# Patient Record
Sex: MALE | Race: WHITE | NOT HISPANIC OR LATINO | Employment: OTHER | URBAN - METROPOLITAN AREA
[De-identification: names, ages, dates, MRNs, and addresses within clinical notes are randomized per-mention and may not be internally consistent; named-entity substitution may affect disease eponyms.]

---

## 2017-05-15 ENCOUNTER — INITIAL CONSULT (OUTPATIENT)
Dept: RADIATION ONCOLOGY | Facility: CLINIC | Age: 72
End: 2017-05-15
Payer: MEDICARE

## 2017-05-15 VITALS
RESPIRATION RATE: 18 BRPM | SYSTOLIC BLOOD PRESSURE: 122 MMHG | BODY MASS INDEX: 27.93 KG/M2 | WEIGHT: 173.81 LBS | HEIGHT: 66 IN | TEMPERATURE: 98 F | HEART RATE: 68 BPM | DIASTOLIC BLOOD PRESSURE: 65 MMHG

## 2017-05-15 DIAGNOSIS — C90.00 METASTATIC MULTIPLE MYELOMA TO BONE: ICD-10-CM

## 2017-05-15 PROCEDURE — 99205 OFFICE O/P NEW HI 60 MIN: CPT | Mod: ,,, | Performed by: RADIOLOGY

## 2017-05-15 RX ORDER — SIMVASTATIN 20 MG/1
20 TABLET, FILM COATED ORAL NIGHTLY
COMMUNITY

## 2017-05-15 RX ORDER — POLYETHYLENE GLYCOL 3350 17 G/17G
17 POWDER, FOR SOLUTION ORAL 2 TIMES DAILY
COMMUNITY

## 2017-05-15 NOTE — MR AVS SNAPSHOT
"    [facility]  [address]  [address]  [place] [address]  Phone: [phone]  Fax: [phone]                  Ramo Mirza   5/15/2017 8:00 AM   Initial consult    Description:  Male : 1945   Provider:  [facility], PHYSICIAN SCHED   Department:  [facility]           Reason for Visit     Multiple Myeloma           Diagnoses this Visit        Comments    Metastatic multiple myeloma to bone                To Do List           Future Appointments        Provider Department Dept Phone    2017 1:00 PM [facility] RAD ONC, SIMULATION [facility] [phone]      Goals (5 Years of Data)     None      Follow-Up and Disposition     Return for simulation for tomorrow.    Follow-up and Disposition History           Medications                Verify that the below list of medications is an accurate representation of the medications you are currently taking.  If none reported, the list may be blank. If incorrect, please contact your healthcare provider. Carry this list with you in case of emergency.           Current Medications     polyethylene glycol (MIRALAX) 17 gram PwPk Take 17 g by mouth 2 (two) times daily.    SENNOSIDES (SENOKOT ORAL) Take 8.5 mg by mouth 2 (two) times daily.     simvastatin (ZOCOR) 20 MG tablet Take 20 mg by mouth every evening.           Clinical Reference Information           Your Vitals Were     BP Pulse Temp Resp Height Weight    122/65 (BP Location: Right arm, Patient Position: Sitting, BP Method: Automatic) 68 98.1 °F (36.7 °C) (Oral) 18 5' 6" (1.676 m) 78.8 kg (173 lb 12.8 oz)    BMI                28.05 kg/m2          Blood Pressure          Most Recent Value    BP  122/65      Allergies as of 5/15/2017     No Known Allergies      Immunizations Administered on Date of Encounter - 5/15/2017     None      [employer] Sign UP     Activating your [employer] account is as easy as 1-2-3!     1) Visit " [email or web address], select Sign Up Now, enter this activation code and your date of birth, then select Next.  [email or web address]  Expires: 6/29/2017  8:44 AM      2) Create a username and password to use when you visit [facility] in the future and select a security question in case you lose your password and select Next.    3) Enter your e-mail address and click Sign Up!    Additional Information  If you have questions, please  call [phone] to talk to our [employer] staff. Remember, [employer] is NOT to be used for urgent needs. For medical emergencies, dial 911.         Instructions    Tri booklet given

## 2017-05-15 NOTE — PROGRESS NOTES
"Ramo Mirza  [id number]  1945  5/15/2017  No referring provider defined for this encounter.    REASON FOR CONSULTATION: painful mets  TREATMENT GOAL: palliative    HISTORY OF PRESENT ILLNESS:   76 yo pt with MMyloma with mets to L1 Causing pain.  \    Pt requires 2-3 pain pills per day.  Denies weakness of LE's.  Ambulatory status unchanged.  Review of Systems - Oncology  No past medical history on file.  No past surgical history on file.  Social History     Social History    Marital status:      Spouse name: N/A    Number of children: N/A    Years of education: N/A     Social History Main Topics    Smoking status: Smoker, Current Status Unknown     Packs/day: 0.50     Years: 55.00     Types: Cigarettes     Start date: 5/15/1962    Smokeless tobacco: Never Used    Alcohol use No    Drug use: No    Sexual activity: Not Asked     Other Topics Concern    None     Social History Narrative    None     No family history on file.    PRIOR HISTORY OF CHEMOTHERAPY OR RADIOTHERAPY: Please see HPI for patients prior oncologic history.    Medication List with Changes/Refills   Current Medications    POLYETHYLENE GLYCOL (MIRALAX) 17 GRAM PWPK    Take 17 g by mouth 2 (two) times daily.    SENNOSIDES (SENOKOT ORAL)    Take 8.5 mg by mouth 2 (two) times daily.     SIMVASTATIN (ZOCOR) 20 MG TABLET    Take 20 mg by mouth every evening.     Review of patient's allergies indicates:  No Known Allergies    QUALITY OF LIFE: 70%- Cares for Self: Unable to Carry on Normal Activity or Active Work    Vitals:    05/15/17 0828   BP: 122/65   Pulse: 68   Resp: 18   Temp: 98.1 °F (36.7 °C)   TempSrc: Oral   Weight: 78.8 kg (173 lb 12.8 oz)   Height: 5' 6" (1.676 m)   PainSc:   7   PainLoc: Hip       PHYSICAL EXAM:   A and O pt , Blind    HEENT benign  neck no delmi  Lung  cta  Neuro... No weakness, no sensory deficits    GENERAL: alert; in no apparent distress.   HEAD: normocephalic, atraumatic.  EYES: . Sclera anicteric. " Conjunctiva not injected. Pt blind [place]ito  NOSE/THROAT: no nasal erythema or rhinorrhea. Oropharynx pink, without erythema, ulcerations or thrush.   NECK: no cervical motion rigidity; supple with no masses.  CHEST: clear to auscultation bilaterally; no wheezes, crackles or rubs. Patient is speaking comfortably on room air with normal work of breathing without using accessory muscles of respiration.  CARDIOVASCULAR: regular rate and rhythm; no murmurs, rubs or gallops.  ABDOMEN: soft, nontender, nondistended. Bowel sounds present.   MUSCULOSKELETAL: no tenderness to palpation along the spine or scapulae. Normal range of motion. Pain in L spine. No radiculopathy.  No LE weakness.  NEUROLOGIC: cranial nerves II-XII intact bilaterally. Strength 5/5 in bilateral upper and lower extremities. No sensory deficits appreciated. Reflexes globally intact. No cerebellar signs. Normal gait.  LYMPHATIC: no cervical, supraclavicular or axillary adenopathy appreciated bilaterally.   EXTREMITIES: no clubbing, cyanosis, edema.  SKIN: no erythema, rashes, ulcerations noted.     REVIEW OF IMAGING/PATHOLOGY/LABS: Please see HPI. All images reviewed personally by dictating physician.     ASSESSMENT: M Myeloma with L! lesion  PLAN: xrt to T12- L2,  rec 2500 cgy in 10 fxs.  Discussed rationale and side effects with pt and daughter      We discussed the risks and benefits of the above treatment and have gone over in detail the acute and late toxicities of radiation therapy to the lumbar L!. The patient expressed  understanding and has signed a consent form which is included in the patients chart. The patient has our contact information and understands that they are free to contact us at any time with questions or concerns regarding radiation therapy.    DISPOSITION: RTC FOR CT SIM    TIME SPENT WITH PATIENT: I have personally seen and evaluated this patient. Approximately one hour was spent in consultation with the patient, greater than 50% of  this time was spent discussing the personalized oncologic treatment plan and details of radiation therapy with the patient.     PHYSICIAN: Brayan Araya MD

## 2017-06-29 ENCOUNTER — DOCUMENTATION ONLY (OUTPATIENT)
Dept: RADIATION ONCOLOGY | Facility: CLINIC | Age: 72
End: 2017-06-29

## 2017-06-29 NOTE — PROGRESS NOTES
NUTRITION NOTE  Ramo has metastatic MML.  He had pallative radiation to his spine. He went to [place] for treatment and is now getting Velcade.  He states his appetite is good and he has actually gained 9 pounds benton getting his radiation in May. His weight today was 182 lbs.  Plan: Gave daughter my chemo packet for chemo school and advised if she had any questions to call.  2. RD contact information left with daughter.                                                    Fredi